# Patient Record
Sex: FEMALE | Race: WHITE
[De-identification: names, ages, dates, MRNs, and addresses within clinical notes are randomized per-mention and may not be internally consistent; named-entity substitution may affect disease eponyms.]

---

## 2017-02-15 PROBLEM — Z00.00 ENCOUNTER FOR PREVENTIVE HEALTH EXAMINATION: Status: ACTIVE | Noted: 2017-02-15

## 2017-02-21 ENCOUNTER — APPOINTMENT (OUTPATIENT)
Dept: CARDIOLOGY | Facility: CLINIC | Age: 57
End: 2017-02-21

## 2017-05-15 ENCOUNTER — OUTPATIENT (OUTPATIENT)
Dept: OUTPATIENT SERVICES | Facility: HOSPITAL | Age: 57
LOS: 1 days | Discharge: HOME | End: 2017-05-15

## 2017-05-15 VITALS
TEMPERATURE: 99 F | SYSTOLIC BLOOD PRESSURE: 117 MMHG | HEIGHT: 64 IN | WEIGHT: 180.34 LBS | OXYGEN SATURATION: 97 % | HEART RATE: 72 BPM | RESPIRATION RATE: 18 BRPM | DIASTOLIC BLOOD PRESSURE: 72 MMHG

## 2017-05-15 DIAGNOSIS — I10 ESSENTIAL (PRIMARY) HYPERTENSION: ICD-10-CM

## 2017-05-15 DIAGNOSIS — Z98.84 BARIATRIC SURGERY STATUS: Chronic | ICD-10-CM

## 2017-05-15 DIAGNOSIS — M25.511 PAIN IN RIGHT SHOULDER: ICD-10-CM

## 2017-05-15 DIAGNOSIS — Z98.890 OTHER SPECIFIED POSTPROCEDURAL STATES: Chronic | ICD-10-CM

## 2017-05-15 DIAGNOSIS — M25.519 PAIN IN UNSPECIFIED SHOULDER: ICD-10-CM

## 2017-05-15 DIAGNOSIS — E11.9 TYPE 2 DIABETES MELLITUS WITHOUT COMPLICATIONS: ICD-10-CM

## 2017-05-15 DIAGNOSIS — Z01.818 ENCOUNTER FOR OTHER PREPROCEDURAL EXAMINATION: ICD-10-CM

## 2017-05-15 LAB
ANION GAP SERPL CALC-SCNC: 6 MMOL/L — SIGNIFICANT CHANGE UP (ref 5–17)
BASOPHILS # BLD AUTO: 0.1 K/UL — SIGNIFICANT CHANGE UP (ref 0–0.2)
BASOPHILS NFR BLD AUTO: 1.7 % — SIGNIFICANT CHANGE UP (ref 0–2)
BUN SERPL-MCNC: 18 MG/DL — SIGNIFICANT CHANGE UP (ref 7–23)
CALCIUM SERPL-MCNC: 8.9 MG/DL — SIGNIFICANT CHANGE UP (ref 8.5–10.1)
CHLORIDE SERPL-SCNC: 108 MMOL/L — SIGNIFICANT CHANGE UP (ref 96–108)
CO2 SERPL-SCNC: 31 MMOL/L — SIGNIFICANT CHANGE UP (ref 22–31)
CREAT SERPL-MCNC: 0.62 MG/DL — SIGNIFICANT CHANGE UP (ref 0.5–1.3)
EOSINOPHIL # BLD AUTO: 0.1 K/UL — SIGNIFICANT CHANGE UP (ref 0–0.5)
EOSINOPHIL NFR BLD AUTO: 1.9 % — SIGNIFICANT CHANGE UP (ref 0–6)
GLUCOSE SERPL-MCNC: 79 MG/DL — SIGNIFICANT CHANGE UP (ref 70–99)
HCT VFR BLD CALC: 41.9 % — SIGNIFICANT CHANGE UP (ref 34.5–45)
HGB BLD-MCNC: 13.7 G/DL — SIGNIFICANT CHANGE UP (ref 11.5–15.5)
HIV 1 & 2 AB SERPL IA.RAPID: SIGNIFICANT CHANGE UP
LYMPHOCYTES # BLD AUTO: 1.6 K/UL — SIGNIFICANT CHANGE UP (ref 1–3.3)
LYMPHOCYTES # BLD AUTO: 32.2 % — SIGNIFICANT CHANGE UP (ref 13–44)
MCHC RBC-ENTMCNC: 27.6 PG — SIGNIFICANT CHANGE UP (ref 27–34)
MCHC RBC-ENTMCNC: 32.8 GM/DL — SIGNIFICANT CHANGE UP (ref 32–36)
MCV RBC AUTO: 84 FL — SIGNIFICANT CHANGE UP (ref 80–100)
MONOCYTES # BLD AUTO: 0.4 K/UL — SIGNIFICANT CHANGE UP (ref 0–0.9)
MONOCYTES NFR BLD AUTO: 7.9 % — SIGNIFICANT CHANGE UP (ref 2–14)
NEUTROPHILS # BLD AUTO: 2.9 K/UL — SIGNIFICANT CHANGE UP (ref 1.8–7.4)
NEUTROPHILS NFR BLD AUTO: 56.2 % — SIGNIFICANT CHANGE UP (ref 43–77)
PLATELET # BLD AUTO: 291 K/UL — SIGNIFICANT CHANGE UP (ref 150–400)
POTASSIUM SERPL-MCNC: 3.7 MMOL/L — SIGNIFICANT CHANGE UP (ref 3.5–5.3)
POTASSIUM SERPL-SCNC: 3.7 MMOL/L — SIGNIFICANT CHANGE UP (ref 3.5–5.3)
RBC # BLD: 4.99 M/UL — SIGNIFICANT CHANGE UP (ref 3.8–5.2)
RBC # FLD: 13 % — SIGNIFICANT CHANGE UP (ref 11–15)
SODIUM SERPL-SCNC: 145 MMOL/L — SIGNIFICANT CHANGE UP (ref 135–145)
WBC # BLD: 5.1 K/UL — SIGNIFICANT CHANGE UP (ref 3.8–10.5)
WBC # FLD AUTO: 5.1 K/UL — SIGNIFICANT CHANGE UP (ref 3.8–10.5)

## 2017-05-15 RX ORDER — SODIUM CHLORIDE 9 MG/ML
3 INJECTION INTRAMUSCULAR; INTRAVENOUS; SUBCUTANEOUS EVERY 8 HOURS
Qty: 0 | Refills: 0 | Status: DISCONTINUED | OUTPATIENT
Start: 2017-05-19 | End: 2017-06-03

## 2017-05-15 NOTE — H&P PST ADULT - HISTORY OF PRESENT ILLNESS
57 yo female c/o right shoulder pain- had evaluation- impingement- scheduled for right shoulder arthroscopy

## 2017-05-18 RX ORDER — CHOLECALCIFEROL (VITAMIN D3) 125 MCG
1 CAPSULE ORAL
Qty: 0 | Refills: 0 | COMMUNITY

## 2017-05-19 ENCOUNTER — RESULT REVIEW (OUTPATIENT)
Age: 57
End: 2017-05-19

## 2017-05-19 ENCOUNTER — TRANSCRIPTION ENCOUNTER (OUTPATIENT)
Age: 57
End: 2017-05-19

## 2017-05-19 ENCOUNTER — OUTPATIENT (OUTPATIENT)
Dept: OUTPATIENT SERVICES | Facility: HOSPITAL | Age: 57
LOS: 1 days | Discharge: HOME | End: 2017-05-19
Payer: COMMERCIAL

## 2017-05-19 VITALS
HEART RATE: 65 BPM | DIASTOLIC BLOOD PRESSURE: 65 MMHG | OXYGEN SATURATION: 98 % | TEMPERATURE: 97 F | RESPIRATION RATE: 16 BRPM | SYSTOLIC BLOOD PRESSURE: 111 MMHG

## 2017-05-19 VITALS
HEIGHT: 64 IN | HEART RATE: 73 BPM | DIASTOLIC BLOOD PRESSURE: 60 MMHG | TEMPERATURE: 97 F | OXYGEN SATURATION: 98 % | RESPIRATION RATE: 18 BRPM | WEIGHT: 169.98 LBS | SYSTOLIC BLOOD PRESSURE: 106 MMHG

## 2017-05-19 DIAGNOSIS — Z98.890 OTHER SPECIFIED POSTPROCEDURAL STATES: Chronic | ICD-10-CM

## 2017-05-19 DIAGNOSIS — Z98.84 BARIATRIC SURGERY STATUS: Chronic | ICD-10-CM

## 2017-05-19 PROCEDURE — 88304 TISSUE EXAM BY PATHOLOGIST: CPT | Mod: 26

## 2017-05-19 RX ORDER — TRIAMTERENE 100 MG/1
1 CAPSULE ORAL
Qty: 0 | Refills: 0 | COMMUNITY

## 2017-05-19 RX ORDER — ONDANSETRON 8 MG/1
4 TABLET, FILM COATED ORAL ONCE
Qty: 0 | Refills: 0 | Status: DISCONTINUED | OUTPATIENT
Start: 2017-05-19 | End: 2017-05-19

## 2017-05-19 RX ORDER — SITAGLIPTIN 50 MG/1
1 TABLET, FILM COATED ORAL
Qty: 0 | Refills: 0 | COMMUNITY

## 2017-05-19 RX ORDER — SODIUM CHLORIDE 9 MG/ML
1000 INJECTION, SOLUTION INTRAVENOUS
Qty: 0 | Refills: 0 | Status: DISCONTINUED | OUTPATIENT
Start: 2017-05-19 | End: 2017-05-19

## 2017-05-19 RX ORDER — LEVOTHYROXINE SODIUM 125 MCG
1 TABLET ORAL
Qty: 0 | Refills: 0 | COMMUNITY

## 2017-05-19 RX ORDER — ERGOCALCIFEROL 1.25 MG/1
1 CAPSULE ORAL
Qty: 0 | Refills: 0 | COMMUNITY

## 2017-05-19 RX ORDER — MORPHINE SULFATE 50 MG/1
4 CAPSULE, EXTENDED RELEASE ORAL
Qty: 0 | Refills: 0 | Status: DISCONTINUED | OUTPATIENT
Start: 2017-05-19 | End: 2017-05-19

## 2017-05-19 RX ORDER — SODIUM CHLORIDE 9 MG/ML
1000 INJECTION, SOLUTION INTRAVENOUS
Qty: 0 | Refills: 0 | Status: DISCONTINUED | OUTPATIENT
Start: 2017-05-19 | End: 2017-06-03

## 2017-05-19 RX ORDER — ATORVASTATIN CALCIUM 80 MG/1
1 TABLET, FILM COATED ORAL
Qty: 0 | Refills: 0 | COMMUNITY

## 2017-05-19 RX ORDER — OXYCODONE HYDROCHLORIDE 5 MG/1
1 TABLET ORAL
Qty: 40 | Refills: 0 | OUTPATIENT
Start: 2017-05-19

## 2017-05-19 RX ORDER — DULOXETINE HYDROCHLORIDE 30 MG/1
1 CAPSULE, DELAYED RELEASE ORAL
Qty: 0 | Refills: 0 | COMMUNITY

## 2017-05-19 RX ADMIN — SODIUM CHLORIDE 75 MILLILITER(S): 9 INJECTION, SOLUTION INTRAVENOUS at 11:33

## 2017-05-19 NOTE — ASU DISCHARGE PLAN (ADULT/PEDIATRIC). - MEDICATION SUMMARY - MEDICATIONS TO TAKE
I will START or STAY ON the medications listed below when I get home from the hospital:    acetaminophen-oxyCODONE 325 mg-5 mg oral tablet  -- 1 tab(s) by mouth every 4 hours PRN pain MDD:6  -- Caution federal law prohibits the transfer of this drug to any person other  than the person for whom it was prescribed.  May cause drowsiness.  Alcohol may intensify this effect.  Use care when operating dangerous machinery.  This prescription cannot be refilled.  This product contains acetaminophen.  Do not use  with any other product containing acetaminophen to prevent possible liver damage.  Using more of this medication than prescribed may cause serious breathing problems.    -- Indication: For pain    enalapril 2.5 mg oral tablet  -- 1 tab(s) by mouth once a day  -- Indication: For per pmd    DULoxetine 30 mg oral delayed release capsule  -- 1 cap(s) by mouth once a day  -- Indication: For per pmd    Januvia 50 mg oral tablet  -- 1 tab(s) by mouth once a day  -- Indication: For per pmd    atorvastatin 40 mg oral tablet  -- 1 tab(s) by mouth once a day  -- Indication: For per pmd    triamterene  -- 1 tab(s) by mouth once a day  -- Indication: For per pmd    levothyroxine 100 mcg (0.1 mg) oral tablet  -- 1 tab(s) by mouth once a day  -- Indication: For per pmd    Vitamin D2 50,000 intl units (1.25 mg) oral capsule  -- 1 cap(s) by mouth once a week  --  On Sundays  -- Indication: For per pmd

## 2017-05-19 NOTE — BRIEF OPERATIVE NOTE - PROCEDURE
Rotator cuff repair  05/19/2017  subacromial decompression, labral debridement, acromioplasty  Active  [facility]INA

## 2017-05-25 DIAGNOSIS — M24.111 OTHER ARTICULAR CARTILAGE DISORDERS, RIGHT SHOULDER: ICD-10-CM

## 2017-05-25 DIAGNOSIS — M75.41 IMPINGEMENT SYNDROME OF RIGHT SHOULDER: ICD-10-CM

## 2017-05-25 DIAGNOSIS — E66.9 OBESITY, UNSPECIFIED: ICD-10-CM

## 2017-05-25 DIAGNOSIS — M65.9 SYNOVITIS AND TENOSYNOVITIS, UNSPECIFIED: ICD-10-CM

## 2017-05-25 DIAGNOSIS — M75.51 BURSITIS OF RIGHT SHOULDER: ICD-10-CM

## 2017-05-25 DIAGNOSIS — Z91.040 LATEX ALLERGY STATUS: ICD-10-CM

## 2019-07-16 PROBLEM — I10 ESSENTIAL (PRIMARY) HYPERTENSION: Chronic | Status: ACTIVE | Noted: 2017-05-15

## 2019-07-16 PROBLEM — E66.9 OBESITY, UNSPECIFIED: Chronic | Status: ACTIVE | Noted: 2017-05-15

## 2019-07-16 PROBLEM — E11.9 TYPE 2 DIABETES MELLITUS WITHOUT COMPLICATIONS: Chronic | Status: ACTIVE | Noted: 2017-05-15

## 2020-02-26 ENCOUNTER — APPOINTMENT (OUTPATIENT)
Dept: ENDOCRINOLOGY | Facility: CLINIC | Age: 60
End: 2020-02-26
Payer: COMMERCIAL

## 2020-02-26 VITALS
WEIGHT: 192 LBS | SYSTOLIC BLOOD PRESSURE: 142 MMHG | HEART RATE: 92 BPM | HEIGHT: 64 IN | BODY MASS INDEX: 32.78 KG/M2 | DIASTOLIC BLOOD PRESSURE: 80 MMHG

## 2020-02-26 DIAGNOSIS — Z86.59 PERSONAL HISTORY OF OTHER MENTAL AND BEHAVIORAL DISORDERS: ICD-10-CM

## 2020-02-26 DIAGNOSIS — Z87.898 PERSONAL HISTORY OF OTHER SPECIFIED CONDITIONS: ICD-10-CM

## 2020-02-26 DIAGNOSIS — Z87.442 PERSONAL HISTORY OF URINARY CALCULI: ICD-10-CM

## 2020-02-26 DIAGNOSIS — Z80.9 FAMILY HISTORY OF MALIGNANT NEOPLASM, UNSPECIFIED: ICD-10-CM

## 2020-02-26 DIAGNOSIS — Z83.3 FAMILY HISTORY OF DIABETES MELLITUS: ICD-10-CM

## 2020-02-26 DIAGNOSIS — Z87.891 PERSONAL HISTORY OF NICOTINE DEPENDENCE: ICD-10-CM

## 2020-02-26 DIAGNOSIS — Z86.39 PERSONAL HISTORY OF OTHER ENDOCRINE, NUTRITIONAL AND METABOLIC DISEASE: ICD-10-CM

## 2020-02-26 LAB — GLUCOSE BLDC GLUCOMTR-MCNC: 107

## 2020-02-26 PROCEDURE — 82962 GLUCOSE BLOOD TEST: CPT

## 2020-02-26 PROCEDURE — 99204 OFFICE O/P NEW MOD 45 MIN: CPT | Mod: 25

## 2020-02-26 RX ORDER — SITAGLIPTIN 50 MG/1
50 TABLET, FILM COATED ORAL DAILY
Refills: 0 | Status: DISCONTINUED | COMMUNITY
End: 2020-02-26

## 2020-02-26 NOTE — REVIEW OF SYSTEMS
[Recent Weight Gain (___ Lbs)] : recent [unfilled] ~Ulb weight gain [Joint Stiffness] : joint stiffness [Insomnia] : insomnia [Fatigue] : no fatigue [Chest Pain] : no chest pain [Shortness Of Breath] : no shortness of breath [Nausea] : no nausea [Polyuria] : no polyuria [Pain/Numbness of Digits] : no pain/numbness of digits [Ulcer] : no ulcer [Polydipsia] : no polydipsia [FreeTextEntry3] : No DR

## 2020-02-26 NOTE — PHYSICAL EXAM
[No Acute Distress] : no acute distress [Normal Sclera/Conjunctiva] : normal sclera/conjunctiva [No Neck Mass] : no neck mass was observed [No Proptosis] : no proptosis [No LAD] : no lymphadenopathy [Thyroid Not Enlarged] : the thyroid was not enlarged [No Thyroid Nodules] : there were no palpable thyroid nodules [Normal Rate] : heart rate was normal  [Clear to Auscultation] : lungs were clear to auscultation bilaterally [Normal Rate and Effort] : normal respiratory rhythm and effort [Normal S1, S2] : normal S1 and S2 [Regular Rhythm] : with a regular rhythm [Murmurs] : no murmurs [Normal Gait] : normal gait [No Edema] : there was no peripheral edema [No Clubbing, Cyanosis] : no clubbing  or cyanosis of the fingernails [Left Foot Was Examined] : left foot ~C was examined [Right Foot Was Examined] : right foot ~C was examined [Normal] : normal [No Tremors] : no tremors [2+] : 2+ in the dorsalis pedis [Normal Sensation on Monofilament Testing] : normal sensation on monofilament testing of lower extremities [Normal Insight/Judgement] : insight and judgment were intact [Normal Affect] : the affect was normal [Normal Mood] : the mood was normal [Foot Ulcers] : no foot ulcers [Acanthosis Nigricans] : no acanthosis nigricans [de-identified] : Obese female [Diminished Throughout Both Feet] : normal tactile sensation with monofilament testing throughout both feet

## 2020-02-26 NOTE — HISTORY OF PRESENT ILLNESS
[FreeTextEntry1] : Was diagnosed with DM based off blood work in 2009.  Was initially treated with metformin, but developed rash (tried metformin 3 separate times with rash occurring after several times).  Then was started on Lantus and Humalog and Januvia.  Went for gastric bypass in 2016 and insulin was stopped thereafter.  Stayed on Januvia, but insulin was stopped after surgery.    Lost about 75 pounds after surgery, but has now gained back 20 pounds and is concerned about this and complains of increased hunger.    Ran out of all of her medications about 9 months ago.  Also has a history of hypothyroidism and was prescribed LT4 100 mcg daily, but ran out of this. \par \par Quality: type 2 DM\par Severity:  moderate\par Duration of diabetes:  since 2009\par Onset: found on routine labs \par Associated Complications/ Symptoms:   No known microvascular complications\par Modifying Factors:  Better with medication and after gastric bypass\par \par SMBG:  no longer testing, but does have some rare symptoms of hypoglycemia.  \par \par Current Diabetic Medication Regimen:\par No medications right now, was on Januvia.  \par

## 2020-02-26 NOTE — ASSESSMENT
[FreeTextEntry1] : 59 year old female with type 2 DM, obesity s/p gastric bypass, hypothyroidism, hyperlipidemia and vitamin D Deficiency.  \par \par 1.  Type 2 DM-  check labs now.  Start SMBG and test once daily alternating between fasting and PP times.  Due to concern of weight gain and increase hunger, will D/C Januvia and start Ozempic.  \par 2.  Hypothyroidism-  likely needs to resume LT4, but will check TFTs to confirm\par 3.  Hyperlipidemia-  check lipids now and if needed, add back atorvastatin\par 4.  Vitamin D deficiency-  check level now.

## 2020-02-28 LAB
25(OH)D3 SERPL-MCNC: 24.7 NG/ML
ALBUMIN SERPL ELPH-MCNC: 4.5 G/DL
ALP BLD-CCNC: 115 U/L
ALT SERPL-CCNC: 34 U/L
ANION GAP SERPL CALC-SCNC: 13 MMOL/L
AST SERPL-CCNC: 26 U/L
BASOPHILS # BLD AUTO: 0.04 K/UL
BASOPHILS NFR BLD AUTO: 0.7 %
BILIRUB SERPL-MCNC: 0.2 MG/DL
BUN SERPL-MCNC: 15 MG/DL
CALCIUM SERPL-MCNC: 9.9 MG/DL
CHLORIDE SERPL-SCNC: 103 MMOL/L
CHOLEST SERPL-MCNC: 230 MG/DL
CHOLEST/HDLC SERPL: 2.9 RATIO
CO2 SERPL-SCNC: 27 MMOL/L
CREAT SERPL-MCNC: 0.66 MG/DL
CREAT SPEC-SCNC: 136 MG/DL
EOSINOPHIL # BLD AUTO: 0.14 K/UL
EOSINOPHIL NFR BLD AUTO: 2.3 %
ESTIMATED AVERAGE GLUCOSE: 157 MG/DL
GLUCOSE SERPL-MCNC: 121 MG/DL
HBA1C MFR BLD HPLC: 7.1 %
HCT VFR BLD CALC: 45.2 %
HDLC SERPL-MCNC: 80 MG/DL
HGB BLD-MCNC: 13.4 G/DL
IMM GRANULOCYTES NFR BLD AUTO: 0.3 %
LDLC SERPL CALC-MCNC: 122 MG/DL
LYMPHOCYTES # BLD AUTO: 2.12 K/UL
LYMPHOCYTES NFR BLD AUTO: 35.1 %
MAN DIFF?: NORMAL
MCHC RBC-ENTMCNC: 26 PG
MCHC RBC-ENTMCNC: 29.6 GM/DL
MCV RBC AUTO: 87.8 FL
MICROALBUMIN 24H UR DL<=1MG/L-MCNC: <1.2 MG/DL
MICROALBUMIN/CREAT 24H UR-RTO: NORMAL MG/G
MONOCYTES # BLD AUTO: 0.44 K/UL
MONOCYTES NFR BLD AUTO: 7.3 %
NEUTROPHILS # BLD AUTO: 3.28 K/UL
NEUTROPHILS NFR BLD AUTO: 54.3 %
PLATELET # BLD AUTO: 390 K/UL
POTASSIUM SERPL-SCNC: 4.5 MMOL/L
PROT SERPL-MCNC: 7 G/DL
RBC # BLD: 5.15 M/UL
RBC # FLD: 15 %
SODIUM SERPL-SCNC: 143 MMOL/L
T4 FREE SERPL-MCNC: 1 NG/DL
THYROGLOB AB SERPL-ACNC: 312 IU/ML
THYROPEROXIDASE AB SERPL IA-ACNC: 595 IU/ML
TRIGL SERPL-MCNC: 137 MG/DL
TSH SERPL-ACNC: 6.54 UIU/ML
WBC # FLD AUTO: 6.04 K/UL

## 2020-03-03 RX ORDER — LEVOTHYROXINE SODIUM 0.1 MG/1
100 TABLET ORAL DAILY
Refills: 0 | Status: DISCONTINUED | COMMUNITY
End: 2020-03-03

## 2020-05-27 ENCOUNTER — APPOINTMENT (OUTPATIENT)
Dept: ENDOCRINOLOGY | Facility: CLINIC | Age: 60
End: 2020-05-27
Payer: COMMERCIAL

## 2020-05-27 PROCEDURE — 99213 OFFICE O/P EST LOW 20 MIN: CPT | Mod: 95

## 2020-05-27 NOTE — HISTORY OF PRESENT ILLNESS
[Medical Office: ([facility])___] : at the medical office located in  [Other Location: e.g. School (Enter Location, City,State)___] : at [unfilled], at the time of the visit. [Verbal consent obtained from patient] : the patient, [unfilled] [FreeTextEntry1] : Was diagnosed with DM based off blood work in .  Was initially treated with metformin, but developed rash (tried metformin 3 separate times with rash occurring after several times).  Then was started on Lantus and Humalog and Januvia.  Went for gastric bypass in  and insulin was stopped thereafter.  Stayed on Januvia, but insulin was stopped after surgery.    Lost about 75 pounds after surgery. \par \par Quality: type 2 DM\par Severity:  moderate\par Duration of diabetes:  since \par Onset: found on routine labs \par Associated Complications/ Symptoms: No known microvascular complications\par Modifying Factors:  Better with medication and after gastric bypass\par \par SMB time a day \par average blood glucose 148 \par \par Current Diabetic Medication Regimen:\par Ozempic 0.5 mg weekly - constipation for 1 week then resolved \par \par -Levothyroxine 50 mcg daily - taking appropriately \par \par Weight: loss 11 pounds \par \par C/o hair loss for 6 months \par

## 2020-05-27 NOTE — REVIEW OF SYSTEMS
[Recent Weight Loss (___ Lbs)] : recent weight loss: [unfilled] lbs [Blurred Vision] : blurred vision [Hair Loss] : hair loss [Dysphagia] : no dysphagia [Visual Field Defect] : no visual field defect [Neck Pain] : no neck pain [Dysphonia] : no dysphonia [Dry Skin] : no dry skin [FreeTextEntry3] : blurry vision at times

## 2020-05-27 NOTE — ASSESSMENT
[FreeTextEntry1] : 59 year old female with type 2 DM, obesity s/p gastric bypass, hypothyroidism, hyperlipidemia and vitamin D Deficiency.  \par \par 1.)  Type 2 DM-  check labs now.  \par - continue Ozempic\par - increase SMBG and test once daily alternating between fasting and PP times.  \par \par 2.)  Hypothyroidism- continue current dose of LT4, but will check TFTs to confirm\par \par 3. ) Hyperlipidemia-  check lipids now and if needed, add back atorvastatin\par \par 4.)   Vitamin D deficiency-  check level now. \par \par Follow up visit 3 months. \par \par Start Time: 2:59\par End TIme:  3:16

## 2020-06-29 ENCOUNTER — RX RENEWAL (OUTPATIENT)
Age: 60
End: 2020-06-29

## 2020-09-08 ENCOUNTER — APPOINTMENT (OUTPATIENT)
Dept: ENDOCRINOLOGY | Facility: CLINIC | Age: 60
End: 2020-09-08
Payer: COMMERCIAL

## 2020-09-08 PROCEDURE — 36415 COLL VENOUS BLD VENIPUNCTURE: CPT

## 2020-09-09 LAB
25(OH)D3 SERPL-MCNC: 26.7 NG/ML
ALBUMIN SERPL ELPH-MCNC: 4.1 G/DL
ALP BLD-CCNC: 89 U/L
ALT SERPL-CCNC: 11 U/L
ANION GAP SERPL CALC-SCNC: 12 MMOL/L
AST SERPL-CCNC: 13 U/L
BASOPHILS # BLD AUTO: 0.05 K/UL
BASOPHILS NFR BLD AUTO: 0.7 %
BILIRUB SERPL-MCNC: 0.3 MG/DL
BUN SERPL-MCNC: 15 MG/DL
CALCIUM SERPL-MCNC: 9.6 MG/DL
CHLORIDE SERPL-SCNC: 107 MMOL/L
CHOLEST SERPL-MCNC: 157 MG/DL
CHOLEST/HDLC SERPL: 2.4 RATIO
CO2 SERPL-SCNC: 26 MMOL/L
CREAT SERPL-MCNC: 0.84 MG/DL
CREAT SPEC-SCNC: 322 MG/DL
EOSINOPHIL # BLD AUTO: 0.2 K/UL
EOSINOPHIL NFR BLD AUTO: 3 %
ESTIMATED AVERAGE GLUCOSE: 126 MG/DL
GLUCOSE SERPL-MCNC: 138 MG/DL
HBA1C MFR BLD HPLC: 6 %
HCT VFR BLD CALC: 45.5 %
HDLC SERPL-MCNC: 66 MG/DL
HGB BLD-MCNC: 13.1 G/DL
IMM GRANULOCYTES NFR BLD AUTO: 0.3 %
LDLC SERPL CALC-MCNC: 67 MG/DL
LYMPHOCYTES # BLD AUTO: 1.87 K/UL
LYMPHOCYTES NFR BLD AUTO: 27.8 %
MAN DIFF?: NORMAL
MCHC RBC-ENTMCNC: 26.3 PG
MCHC RBC-ENTMCNC: 28.8 GM/DL
MCV RBC AUTO: 91.4 FL
MICROALBUMIN 24H UR DL<=1MG/L-MCNC: 3.9 MG/DL
MICROALBUMIN/CREAT 24H UR-RTO: 12 MG/G
MONOCYTES # BLD AUTO: 0.46 K/UL
MONOCYTES NFR BLD AUTO: 6.8 %
NEUTROPHILS # BLD AUTO: 4.12 K/UL
NEUTROPHILS NFR BLD AUTO: 61.4 %
PLATELET # BLD AUTO: 341 K/UL
POTASSIUM SERPL-SCNC: 4 MMOL/L
PROT SERPL-MCNC: 6.2 G/DL
RBC # BLD: 4.98 M/UL
RBC # FLD: 15.7 %
SODIUM SERPL-SCNC: 145 MMOL/L
T4 FREE SERPL-MCNC: 1.2 NG/DL
TRIGL SERPL-MCNC: 119 MG/DL
TSH SERPL-ACNC: 4.82 UIU/ML
VIT B12 SERPL-MCNC: 1259 PG/ML
WBC # FLD AUTO: 6.72 K/UL

## 2020-09-15 ENCOUNTER — APPOINTMENT (OUTPATIENT)
Dept: ENDOCRINOLOGY | Facility: CLINIC | Age: 60
End: 2020-09-15
Payer: COMMERCIAL

## 2020-09-15 ENCOUNTER — TRANSCRIPTION ENCOUNTER (OUTPATIENT)
Age: 60
End: 2020-09-15

## 2020-09-15 PROCEDURE — 99441: CPT

## 2020-09-15 RX ORDER — LEVOTHYROXINE SODIUM 0.05 MG/1
50 TABLET ORAL DAILY
Qty: 90 | Refills: 1 | Status: DISCONTINUED | COMMUNITY
Start: 2020-03-03 | End: 2020-09-15

## 2020-09-15 NOTE — ASSESSMENT
[FreeTextEntry1] : 59 year old female with type 2 DM, obesity s/p gastric bypass, hypothyroidism, hyperlipidemia and vitamin D Deficiency.  Her diabetes control is improving, but she is mildly hypothyroid on current dose of LT4.  \par \par 1. Type 2 DM-  continue current dose of Ozempic.   repeat A1c in 4 months. \par 2. Hypothyroidism-  Increase LT4 to 75 mcg daily.  Repeat TFTs before next appointment.  \par 3. Hyperlipidemia-  Continue atorvastatin\par 4. Vitamin D deficiency- add Vitamin D3 2000 IU daily.

## 2020-09-15 NOTE — HISTORY OF PRESENT ILLNESS
[Home] : at home, [unfilled] , at the time of the visit. [Medical Office: ([facility])___] : at the medical office located in  [Verbal consent obtained from patient] : the patient, [unfilled] [FreeTextEntry1] : Telephonic appointment conducted due to COVID-19 Pandemic. \par Time started:  6:19 PM\par Time Ended:  6:28 PM\par \par Follow up DM and hypothyroidism (Hashimoto's Thyroiditis), hyperlipidemia\par \par Quality: type 2 DM\par Severity:  moderate\par Duration of diabetes:  since 2009\par Onset: found on routine labs \par Associated Complications/ Symptoms:   No known microvascular complications\par Modifying Factors:  Better with medication and after gastric bypass\par \par SMBG: testing once a day with values <140 mg/dl.  \par \par Current Diabetic Medication Regimen:\par Ozempic 0.5 mg qweek\par Intolerant to Metformin due to rash.\par Was on insulin prior to gastric bypass in 2016.  \par \par Has lost some weight since starting Ozempic.  \par

## 2020-09-15 NOTE — REVIEW OF SYSTEMS
[Recent Weight Loss (___ Lbs)] : recent weight loss: [unfilled] lbs [Hair Loss] : hair loss [Fatigue] : no fatigue [Nausea] : no nausea

## 2020-12-14 ENCOUNTER — RX RENEWAL (OUTPATIENT)
Age: 60
End: 2020-12-14

## 2021-01-11 ENCOUNTER — RX RENEWAL (OUTPATIENT)
Age: 61
End: 2021-01-11

## 2021-01-19 ENCOUNTER — APPOINTMENT (OUTPATIENT)
Dept: ENDOCRINOLOGY | Facility: CLINIC | Age: 61
End: 2021-01-19

## 2021-01-26 ENCOUNTER — APPOINTMENT (OUTPATIENT)
Dept: ENDOCRINOLOGY | Facility: CLINIC | Age: 61
End: 2021-01-26
Payer: COMMERCIAL

## 2021-01-26 VITALS
DIASTOLIC BLOOD PRESSURE: 72 MMHG | SYSTOLIC BLOOD PRESSURE: 120 MMHG | HEART RATE: 81 BPM | BODY MASS INDEX: 31.24 KG/M2 | HEIGHT: 64 IN | WEIGHT: 183 LBS | OXYGEN SATURATION: 98 %

## 2021-01-26 LAB — GLUCOSE BLDC GLUCOMTR-MCNC: 93

## 2021-01-26 PROCEDURE — 82962 GLUCOSE BLOOD TEST: CPT

## 2021-01-26 PROCEDURE — 99072 ADDL SUPL MATRL&STAF TM PHE: CPT

## 2021-01-26 PROCEDURE — 99214 OFFICE O/P EST MOD 30 MIN: CPT | Mod: 25

## 2021-01-26 NOTE — ASSESSMENT
[FreeTextEntry1] : 60 year old female with type 2 DM, obesity s/p gastric bypass, hypothyroidism, hyperlipidemia and vitamin D Deficiency.  \par \par 1. Type 2 DM-  continue Ozempic.   Check A1c now.  \par 2. Hypothyroidism-  Continue LT4, check TFTS now (dose increased last visit).\par 3. Hyperlipidemia-  Continue atorvastatin, check lipids now.  \par 4. Vitamin D deficiency-   Continue vitamin D supplement

## 2021-01-26 NOTE — REVIEW OF SYSTEMS
[Recent Weight Loss (___ Lbs)] : recent weight loss: [unfilled] lbs [Hair Loss] : hair loss [Chest Pain] : no chest pain [Shortness Of Breath] : no shortness of breath [Nausea] : no nausea [Pain/Numbness of Digits] : no pain/numbness of digits

## 2021-02-03 ENCOUNTER — TRANSCRIPTION ENCOUNTER (OUTPATIENT)
Age: 61
End: 2021-02-03

## 2021-04-29 ENCOUNTER — RX RENEWAL (OUTPATIENT)
Age: 61
End: 2021-04-29

## 2021-04-30 RX ORDER — LANCETS
EACH MISCELLANEOUS
Qty: 100 | Refills: 1 | Status: ACTIVE | COMMUNITY
Start: 2020-02-26 | End: 1900-01-01

## 2021-04-30 RX ORDER — BLOOD-GLUCOSE METER
W/DEVICE KIT MISCELLANEOUS
Qty: 1 | Refills: 0 | Status: ACTIVE | COMMUNITY
Start: 2021-04-30 | End: 1900-01-01

## 2021-05-12 ENCOUNTER — RX RENEWAL (OUTPATIENT)
Age: 61
End: 2021-05-12

## 2021-05-24 LAB
LDLC SERPL DIRECT ASSAY-MCNC: 70
MICROALBUMIN/CREAT 24H UR-RTO: 3

## 2021-05-25 ENCOUNTER — APPOINTMENT (OUTPATIENT)
Dept: ENDOCRINOLOGY | Facility: CLINIC | Age: 61
End: 2021-05-25

## 2021-12-14 ENCOUNTER — APPOINTMENT (OUTPATIENT)
Dept: ENDOCRINOLOGY | Facility: CLINIC | Age: 61
End: 2021-12-14
Payer: COMMERCIAL

## 2021-12-14 VITALS
SYSTOLIC BLOOD PRESSURE: 124 MMHG | WEIGHT: 160 LBS | HEART RATE: 86 BPM | BODY MASS INDEX: 27.31 KG/M2 | HEIGHT: 64 IN | OXYGEN SATURATION: 98 % | DIASTOLIC BLOOD PRESSURE: 68 MMHG

## 2021-12-14 DIAGNOSIS — Z85.038 PERSONAL HISTORY OF OTHER MALIGNANT NEOPLASM OF LARGE INTESTINE: ICD-10-CM

## 2021-12-14 LAB — GLUCOSE BLDC GLUCOMTR-MCNC: 88

## 2021-12-14 PROCEDURE — 82962 GLUCOSE BLOOD TEST: CPT

## 2021-12-14 PROCEDURE — 99214 OFFICE O/P EST MOD 30 MIN: CPT | Mod: 25

## 2021-12-14 RX ORDER — ERGOCALCIFEROL (VITAMIN D2) 1250 MCG
50000 CAPSULE ORAL
Refills: 0 | Status: DISCONTINUED | COMMUNITY
End: 2021-12-14

## 2021-12-14 NOTE — PHYSICAL EXAM
[Healthy Appearance] : healthy appearance [No Acute Distress] : no acute distress [Normal Sclera/Conjunctiva] : normal sclera/conjunctiva [No Proptosis] : no proptosis [No Neck Mass] : no neck mass was observed [No LAD] : no lymphadenopathy [Supple] : the neck was supple [Thyroid Not Enlarged] : the thyroid was not enlarged [No Thyroid Nodules] : no palpable thyroid nodules [No Respiratory Distress] : no respiratory distress [Clear to Auscultation] : lungs were clear to auscultation bilaterally [Normal S1, S2] : normal S1 and S2 [No Murmurs] : no murmurs [Normal Rate] : heart rate was normal [Regular Rhythm] : with a regular rhythm [Right foot was examined, including] : right foot ~C was examined, including visual inspection with sensory and pulse exams [Left foot was examined, including] : left foot ~C was examined, including visual inspection with sensory and pulse exams [Normal] : normal [2+] : 2+ in the dorsalis pedis [Normal Affect] : the affect was normal [Normal Insight/Judgement] : insight and judgment were intact [Normal Mood] : the mood was normal [Acanthosis Nigricans] : no acanthosis nigricans [Diminished Throughout Both Feet] : normal tactile sensation with monofilament testing throughout both feet [FreeTextEntry1] : Callus [FreeTextEntry5] : Callus

## 2021-12-14 NOTE — ASSESSMENT
[FreeTextEntry1] : 61 year old female with type 2 DM, obesity s/p gastric bypass, hypothyroidism, hyperlipidemia and vitamin D Deficiency.   Her diabetes seems well controlled.   \par \par 1. Type 2 DM-  Check A1c now.  If A1c is < 6%, will start decreasing dose of Ozempic as she reports occasional hypoglycemia and has lost more weight. \par 2. Hypothyroidism-   Resume LT4.  Check TFTs now, but will likely be suboptimal due to missing Rx.   \par 3. Hyperlipidemia-  Continue atorvastatin, check lipids now.  \par 4. Vitamin D deficiency-    Check level now.  May need Rx.   \par \par Follow up in 4 months.

## 2021-12-14 NOTE — REVIEW OF SYSTEMS
[Recent Weight Loss (___ Lbs)] : recent weight loss: [unfilled] lbs [Constipation] : constipation [Chest Pain] : no chest pain [Shortness Of Breath] : no shortness of breath [Nausea] : no nausea [Pain/Numbness of Digits] : no pain/numbness of digits

## 2021-12-14 NOTE — HISTORY OF PRESENT ILLNESS
[FreeTextEntry1] : Follow up DM and hypothyroidism (Hashimoto's Thyroiditis), hyperlipidemia\par In March 2021 had CT done for kidney stone and showed incidental colon mass and was diagnosed with colon cancer and had partial colectomy.  \par \par Quality: type 2 DM\par Severity:  moderate\par Duration of diabetes:  since 2009\par Onset: found on routine labs \par Associated Complications/ Symptoms:   No known microvascular complications\par Modifying Factors:  Better with medication and after gastric bypass\par \par SMBG: testing once a day .  Most BG in the 100- 140 mg/dl range.   Reports a few episodes of mild hypoglycemia.   \par \par Current Diabetic Medication Regimen:\par Ozempic 0.5 mg qweek\par Intolerant to Metformin due to rash.\par Was on insulin prior to gastric bypass in 2016.  \par Ran out of LT4 about a month ago.    \par \par Has lost about 20 pounds since last visit.

## 2022-04-12 ENCOUNTER — RX RENEWAL (OUTPATIENT)
Age: 62
End: 2022-04-12

## 2022-04-27 ENCOUNTER — RX RENEWAL (OUTPATIENT)
Age: 62
End: 2022-04-27

## 2022-05-17 ENCOUNTER — APPOINTMENT (OUTPATIENT)
Dept: ENDOCRINOLOGY | Facility: CLINIC | Age: 62
End: 2022-05-17
Payer: COMMERCIAL

## 2022-05-17 VITALS
WEIGHT: 162 LBS | BODY MASS INDEX: 27.66 KG/M2 | SYSTOLIC BLOOD PRESSURE: 120 MMHG | DIASTOLIC BLOOD PRESSURE: 70 MMHG | HEART RATE: 80 BPM | HEIGHT: 64 IN

## 2022-05-17 DIAGNOSIS — E55.9 VITAMIN D DEFICIENCY, UNSPECIFIED: ICD-10-CM

## 2022-05-17 LAB
GLUCOSE BLDC GLUCOMTR-MCNC: 80
HBA1C MFR BLD HPLC: 6

## 2022-05-17 PROCEDURE — 82962 GLUCOSE BLOOD TEST: CPT

## 2022-05-17 PROCEDURE — 99214 OFFICE O/P EST MOD 30 MIN: CPT | Mod: 25

## 2022-05-17 NOTE — ASSESSMENT
[FreeTextEntry1] : 61 year old female with type 2 DM, obesity s/p gastric bypass, hypothyroidism, hyperlipidemia and vitamin D Deficiency.   Her diabetes seems well controlled.   \par \par 1. Type 2 DM-   Continue Ozempic.  Check labs now.   \par 2. Hypothyroidism-  Continue LT4.  Check TFTs now.    Due to nodularity on exam, check US now.   \par 3. Hyperlipidemia-  Continue atorvastatin, check lipids now.  \par 4. Vitamin D deficiency-    Check level now.  \par \par Follow up in 4 months.

## 2022-05-17 NOTE — REVIEW OF SYSTEMS
[Insomnia] : insomnia [Fatigue] : no fatigue [Recent Weight Gain (___ Lbs)] : no recent weight gain [Recent Weight Loss (___ Lbs)] : no recent weight loss [Chest Pain] : no chest pain [Shortness Of Breath] : no shortness of breath [Nausea] : no nausea [Hair Loss] : no hair loss

## 2022-05-17 NOTE — PHYSICAL EXAM
[Healthy Appearance] : healthy appearance [No Acute Distress] : no acute distress [Normal Sclera/Conjunctiva] : normal sclera/conjunctiva [No Proptosis] : no proptosis [No LAD] : no lymphadenopathy [No Respiratory Distress] : no respiratory distress [Clear to Auscultation] : lungs were clear to auscultation bilaterally [Normal S1, S2] : normal S1 and S2 [No Murmurs] : no murmurs [Normal Rate] : heart rate was normal [Regular Rhythm] : with a regular rhythm [Normal Affect] : the affect was normal [Normal Insight/Judgement] : insight and judgment were intact [Normal Mood] : the mood was normal [Acanthosis Nigricans] : no acanthosis nigricans [de-identified] : palpable nodularity in left thyorid lobe

## 2022-05-17 NOTE — HISTORY OF PRESENT ILLNESS
[FreeTextEntry1] : Follow up DM and hypothyroidism (Hashimoto's Thyroiditis), hyperlipidemia\par PMH of colon cancers/p partial colectomy.    Also had gastric bypass in 2016.\par \par Quality: type 2 DM\par Severity:  moderate\par Duration of diabetes:  since 2009\par Onset: found on routine labs \par Associated Complications/ Symptoms:   No known microvascular complications\par Modifying Factors:  Better with medication and after gastric bypass\par \par SMBG: testing once a day .  Most BG under 130 mg/dl now.    \par \par Current Diabetic Medication Regimen:\par Ozempic 0.5 mg qweek\par Intolerant to Metformin due to rash.\par Was on insulin prior to gastric bypass in 2016.  \par  \par Lost weight after starting Ozempic and now maintaining.

## 2022-09-27 LAB — TSH SERPL-ACNC: 3.05

## 2022-09-28 ENCOUNTER — APPOINTMENT (OUTPATIENT)
Dept: ENDOCRINOLOGY | Facility: CLINIC | Age: 62
End: 2022-09-28

## 2022-10-03 LAB
HBA1C MFR BLD HPLC: 5.3
LDLC SERPL CALC-MCNC: 59
MICROALBUMIN/CREAT 24H UR-RTO: 4

## 2022-10-04 ENCOUNTER — APPOINTMENT (OUTPATIENT)
Dept: ENDOCRINOLOGY | Facility: CLINIC | Age: 62
End: 2022-10-04

## 2022-10-04 VITALS
OXYGEN SATURATION: 97 % | HEART RATE: 89 BPM | SYSTOLIC BLOOD PRESSURE: 122 MMHG | HEIGHT: 64 IN | WEIGHT: 164.5 LBS | RESPIRATION RATE: 16 BRPM | TEMPERATURE: 97.8 F | DIASTOLIC BLOOD PRESSURE: 70 MMHG | BODY MASS INDEX: 28.09 KG/M2

## 2022-10-04 LAB — GLUCOSE BLDC GLUCOMTR-MCNC: 106

## 2022-10-04 PROCEDURE — 82962 GLUCOSE BLOOD TEST: CPT

## 2022-10-04 PROCEDURE — 99214 OFFICE O/P EST MOD 30 MIN: CPT | Mod: 25

## 2022-10-04 NOTE — ASSESSMENT
[FreeTextEntry1] : 62 year old female with type 2 DM, obesity s/p gastric bypass, hypothyroidism, hyperlipidemia and vitamin D Deficiency.   Her diabetes control is improving on GLP-1 agonist therapy.  \par \par 1. Type 2 DM-   Patient prefers to stay on current dose of Ozempic as this has helped maintain current weight, but if A1c remains so low, would recommend decrease in dose in future.  \par 2. Hypothyroidism-  Euthyroid, will continue current dose of LT4.\par 3. Hyperlipidemia-  Continue atorvastatin \par 4.  Vitamin D insufficiency-  add vitamin D3 1000 UT daily.  \par \par Follow up in 6 months.

## 2022-10-04 NOTE — DATA REVIEWED
[FreeTextEntry1] : LABS:\par 10/1/2022:\par TSH 2.34\par Free T4  1.1\par Trig 96\par \par 1/26/2021:\par A1c 6%\par TSH 3.05\par 25(OH)D 18

## 2022-10-04 NOTE — HISTORY OF PRESENT ILLNESS
[FreeTextEntry1] : Follow up DM and hypothyroidism (Hashimoto's Thyroiditis), hyperlipidemia\par PMH of colon cancers/p partial colectomy.    Also had gastric bypass in 2016.\par \par Quality: type 2 DM\par Severity:  moderate\par Duration of diabetes:  since 2009\par Onset: found on routine labs \par Associated Complications/ Symptoms:   No known microvascular complications\par Modifying Factors:  Better with medication and after gastric bypass\par \par SMBG: testing once a day .  Reports most BG under 120 mg/dl.    \par \par Current Diabetic Medication Regimen:\par Ozempic 0.5 mg qweek\par Intolerant to Metformin due to rash.\par Was on insulin prior to gastric bypass in 2016.  \par  \par Weight has remained stable.

## 2022-12-07 ENCOUNTER — RX RENEWAL (OUTPATIENT)
Age: 62
End: 2022-12-07

## 2023-03-30 ENCOUNTER — RX RENEWAL (OUTPATIENT)
Age: 63
End: 2023-03-30

## 2023-04-27 ENCOUNTER — RX RENEWAL (OUTPATIENT)
Age: 63
End: 2023-04-27

## 2023-04-27 RX ORDER — BLOOD SUGAR DIAGNOSTIC
STRIP MISCELLANEOUS
Qty: 100 | Refills: 3 | Status: ACTIVE | COMMUNITY
Start: 2020-02-26 | End: 1900-01-01

## 2023-09-13 ENCOUNTER — RX RENEWAL (OUTPATIENT)
Age: 63
End: 2023-09-13

## 2023-09-22 LAB
HBA1C MFR BLD HPLC: 5.7
LDLC SERPL DIRECT ASSAY-MCNC: 92
MICROALBUMIN/CREAT 24H UR-RTO: 3
TSH SERPL-ACNC: 4.95

## 2023-11-03 ENCOUNTER — RX RENEWAL (OUTPATIENT)
Age: 63
End: 2023-11-03

## 2023-11-06 ENCOUNTER — APPOINTMENT (OUTPATIENT)
Dept: ENDOCRINOLOGY | Facility: CLINIC | Age: 63
End: 2023-11-06
Payer: COMMERCIAL

## 2023-11-06 VITALS
DIASTOLIC BLOOD PRESSURE: 65 MMHG | BODY MASS INDEX: 30.73 KG/M2 | SYSTOLIC BLOOD PRESSURE: 120 MMHG | HEIGHT: 64 IN | WEIGHT: 180 LBS | HEART RATE: 98 BPM | OXYGEN SATURATION: 100 %

## 2023-11-06 DIAGNOSIS — E03.9 HYPOTHYROIDISM, UNSPECIFIED: ICD-10-CM

## 2023-11-06 DIAGNOSIS — E11.9 TYPE 2 DIABETES MELLITUS W/OUT COMPLICATIONS: ICD-10-CM

## 2023-11-06 DIAGNOSIS — E78.5 HYPERLIPIDEMIA, UNSPECIFIED: ICD-10-CM

## 2023-11-06 LAB — GLUCOSE BLDC GLUCOMTR-MCNC: 99

## 2023-11-06 PROCEDURE — 99214 OFFICE O/P EST MOD 30 MIN: CPT | Mod: 25

## 2023-11-06 PROCEDURE — 82962 GLUCOSE BLOOD TEST: CPT

## 2023-12-07 ENCOUNTER — RX RENEWAL (OUTPATIENT)
Age: 63
End: 2023-12-07

## 2023-12-07 RX ORDER — ATORVASTATIN CALCIUM 10 MG/1
10 TABLET, FILM COATED ORAL DAILY
Qty: 90 | Refills: 3 | Status: ACTIVE | COMMUNITY
Start: 2020-06-29 | End: 1900-01-01

## 2023-12-27 NOTE — ASU PATIENT PROFILE, ADULT - FALL HARM RISK TYPE OF ASSESSMENT
Pharmacy on file verified Step-mother states pt c/o rash/irriation to fingers on both hands x2 weeks Admission

## 2024-01-05 ENCOUNTER — APPOINTMENT (OUTPATIENT)
Dept: ORTHOPEDIC SURGERY | Facility: CLINIC | Age: 64
End: 2024-01-05
Payer: COMMERCIAL

## 2024-01-05 VITALS — HEIGHT: 64 IN | BODY MASS INDEX: 30.73 KG/M2 | WEIGHT: 180 LBS

## 2024-01-05 PROCEDURE — 73564 X-RAY EXAM KNEE 4 OR MORE: CPT | Mod: 50

## 2024-01-05 PROCEDURE — 20611 DRAIN/INJ JOINT/BURSA W/US: CPT | Mod: 50

## 2024-01-05 PROCEDURE — 76882 US LMTD JT/FCL EVL NVASC XTR: CPT | Mod: 59

## 2024-01-05 PROCEDURE — 99213 OFFICE O/P EST LOW 20 MIN: CPT | Mod: 25

## 2024-01-05 PROCEDURE — J3490M: CUSTOM

## 2024-01-05 NOTE — PROCEDURE
[Large Joint Injection] : Large joint injection [Bilateral] : bilaterally of the [Knee] : knee [Betadine] : betadine [] : Patient tolerated procedure well [Call if redness, pain or fever occur] : call if redness, pain or fever occur [Apply ice for 15min out of every hour for the next 12-24 hours as tolerated] : apply ice for 15 minutes out of every hour for the next 12-24 hours as tolerated [Patient was advised to rest the joint(s) for ____ days] : patient was advised to rest the joint(s) for [unfilled] days [All ultrasound images have been permanently captured and stored accordingly in our picture archiving and communication system] : All ultrasound images have been permanently captured and stored accordingly in our picture archiving and communication system [Pain] : pain [Inflammation] : inflammation [FreeTextEntry3] : Large Joint Injection / Aspiration: Celestone, Lidocaine, Marcaine and Guidance Ultrasound Large Joint Injection was performed because of pain and inflammation. Anesthesia: ethyl chloride sprayed topically..  Celestone: An injection of Celestone 6 mg , 1 cc. Needle size: 22 gauge ,  Lidocaine: 3 cc.  Marcaine: 3 cc.   Medication was injected in the bilateral knee. Patient has tried OTC's including aspirin, Ibuprofen, Aleve etc or prescription NSAIDS, and/or exercises at home and/ or physical therapy without satisfactory response and Patient has decreased mobility in the joint. After verbal consent using sterile preparation and technique. The risks, benefits, and alternatives to cortisone injection were explained in full to the patient. Risks outlined include but are not limited to infection, sepsis, bleeding, scarring, skin discoloration, temporary increase in pain, syncopal episode, failure to resolve symptoms, allergic reaction, symptom recurrence, and elevation of blood sugar in diabetics. Patient understood the risks. All questions were answered. After discussion of options, patient requested an injection. Oral informed consent was obtained and sterile prep was done of the injection site. Sterile technique was utilized for the procedure including the preparation of the solutions used for the injection. Patient tolerated the procedure well. Advised to ice the injection site this evening. Prep with betadine locally to site. Sterile technique used. Patient tolerated procedure well. Post Procedure Instructions: Patient was advised to call if redness, pain, or fever occur and apply ice for 15 min. out of every hour for the next 12-24 hours as tolerated. patient was advised to rest the joint(s) for 2 days.   Ultrasound Guidance was used for the following reasons: altered anatomic landmarks because of erosive arthritis.   Ultrasound guided injection was performed of the knee, visualization of the needle and placement of injection was performed without complication.

## 2024-01-05 NOTE — DISCUSSION/SUMMARY
[de-identified] : Patient allowed to gently start resuming activities. Discussed change to medication prescription and usage. Offered cortisone steroid injection. Bracing options discussed with patient. Hyaluronic Acid inj pamphlet given to pt. try topical lidocaine reviewed current medications being used by this patient  01/05/2024  RE:  DENIZ DURON   Acct #- [id number]  Attention:  Nurse Reviewer /Medical Director  I am writing this letter as a medical necessity for HA euflexxa both knees Patient has tried analgesics, non-steroid anti-inflammatory agents,  physical therapy, hot or cold compresses,injections of corticosteroids, etc)  which in combination or by themselves has not worked. Based on my patient's condition, I strongly believe that the Hyaluronic aid injections is medically needed.   Thank you for your time and consideration.

## 2024-01-05 NOTE — HISTORY OF PRESENT ILLNESS
[Gradual] : gradual [5] : 5 [Localized] : localized [Injection therapy] : injection therapy [Full time] : Work status: full time [de-identified] : 63 year old female with pain in bilateral knees, symptoms started to return about a month ago, no specific injury. Pain with walking, stairs, getting up from a seated position. No recent injury or mechanical symptoms. She uses biofreeze when more severe. Pain occasionally wakes her at night. She had CSI in 2021 with help in the left knee until now.  [] : no [FreeTextEntry1] : knees [FreeTextEntry5] : has had chronic knee pain , no recent injuries, and has had injections  [FreeTextEntry6] : cramping at night  [de-identified] : 2021 [de-identified] : Dr. Gallito vidal

## 2024-01-05 NOTE — PHYSICAL EXAM
[Bilateral] : knee bilaterally [5___] : hamstring 5[unfilled]/5 [All Views] : anteroposterior, lateral, skyline, and anteroposterior standing [Degenerative change] : Degenerative change [] : no calf tenderness [Left] : left knee [advanced tricompartmental OA with medial compartment narrowing and varus alignment] : advanced tricompartmental OA with medial compartment narrowing and varus alignment [FreeTextEntry9] : R adv lat [TWNoteComboBox7] : flexion 120 degrees [de-identified] : extension 0 degrees

## 2024-01-09 ENCOUNTER — RX RENEWAL (OUTPATIENT)
Age: 64
End: 2024-01-09

## 2024-01-09 RX ORDER — LEVOTHYROXINE SODIUM 0.07 MG/1
75 TABLET ORAL DAILY
Qty: 96 | Refills: 3 | Status: ACTIVE | COMMUNITY
Start: 2020-09-15 | End: 1900-01-01

## 2024-03-01 ENCOUNTER — APPOINTMENT (OUTPATIENT)
Dept: ORTHOPEDIC SURGERY | Facility: CLINIC | Age: 64
End: 2024-03-01
Payer: COMMERCIAL

## 2024-03-01 VITALS — WEIGHT: 180 LBS | BODY MASS INDEX: 30.73 KG/M2 | HEIGHT: 64 IN

## 2024-03-01 PROCEDURE — 99214 OFFICE O/P EST MOD 30 MIN: CPT

## 2024-03-01 RX ORDER — MELOXICAM 15 MG/1
15 TABLET ORAL
Qty: 30 | Refills: 0 | Status: ACTIVE | COMMUNITY
Start: 2024-03-01 | End: 1900-01-01

## 2024-03-01 NOTE — DISCUSSION/SUMMARY
[de-identified] : Patient allowed to gently start resuming activities. Discussed change to medication prescription and usage. Offered cortisone steroid injection. Bracing options discussed with patient. Hyaluronic Acid inj pamphlet given to pt. try topical lidocaine reviewed current medications being used by this patient

## 2024-03-01 NOTE — HISTORY OF PRESENT ILLNESS
[5] : 5 [Dull/Aching] : dull/aching [Localized] : localized [Sitting] : sitting [Stairs] : stairs [Full time] : Work status: full time [de-identified] : pain in bilateral knees, symptoms persists, no specific injury. Pain with walking, stairs, getting up from a seated position. No recent injury or mechanical symptoms. CSI did not help, no swelling [Gradual] : gradual [Injection therapy] : injection therapy [] : no [FreeTextEntry1] : knees [FreeTextEntry5] : has had chronic knee pain , no recent injuries, and has had injections  [FreeTextEntry9] : little relief from cortiosne  [FreeTextEntry6] : cramping at night  [de-identified] : getting up [de-identified] : 2021 [de-identified] : Dr. Gallito vidal

## 2024-03-01 NOTE — PHYSICAL EXAM
[Bilateral] : knee bilaterally [5___] : hamstring 5[unfilled]/5 [] : patient ambulates without assistive device [TWNoteComboBox7] : flexion 120 degrees [de-identified] : extension 0 degrees

## 2024-03-08 ENCOUNTER — APPOINTMENT (OUTPATIENT)
Dept: ORTHOPEDIC SURGERY | Facility: CLINIC | Age: 64
End: 2024-03-08
Payer: COMMERCIAL

## 2024-03-08 VITALS — HEIGHT: 64 IN | WEIGHT: 180 LBS | BODY MASS INDEX: 30.73 KG/M2

## 2024-03-08 PROCEDURE — 99213 OFFICE O/P EST LOW 20 MIN: CPT | Mod: 25

## 2024-03-08 PROCEDURE — 20611 DRAIN/INJ JOINT/BURSA W/US: CPT | Mod: 50

## 2024-03-08 RX ORDER — CELECOXIB 200 MG/1
200 CAPSULE ORAL
Qty: 30 | Refills: 0 | Status: ACTIVE | COMMUNITY
Start: 2024-03-08 | End: 1900-01-01

## 2024-03-08 NOTE — PHYSICAL EXAM
[Bilateral] : knee bilaterally [5___] : hamstring 5[unfilled]/5 [] : no effusion [TWNoteComboBox7] : flexion 120 degrees [de-identified] : extension 0 degrees

## 2024-03-08 NOTE — HISTORY OF PRESENT ILLNESS
[5] : 5 [Radiating] : radiating [Injection therapy] : injection therapy [Ice] : ice [Walking] : walking [de-identified] : Patient returns to start Visco-3 series for both knees.  [] : no [FreeTextEntry1] : knees [FreeTextEntry7] : both ankles [de-identified] : getting mup

## 2024-03-08 NOTE — PROCEDURE
[FreeTextEntry3] : Visco-3  (Large Joint) with Ultrasound Guidance Viscosupplementation Injection: X-ray evidence of Osteoarthritis on this or prior visit and Patient has tried OTC's including aspirin, Ibuprofen, Aleve etc or prescription NSAIDS, and/or exercises at home and/ or physical therapy without satisfactory response.  An injection of Visco-3 2ml #1 was injected into the bilat knee(s). after verbal consent using sterile technique. The risks, benefits, and alternatives to Viscosupplementation injection were explained in full to the patient. Risks outlined include but are not limited to infection, sepsis, bleeding, scarring, skin discoloration, temporary increase in pain, syncopal episode, failure to resolve symptoms, allergic reaction, and symptom recurrence. Signs and symptoms of infection reviewed and patient advised to call immediately for redness, fevers, and/or chills. Patient understood the risks. All questions were answered. After discussion of options, patient requested Viscosupplementation. Oral informed consent was obtained and sterile prep was done of the injection site. Sterile technique was used without complications. The patient tolerated the procedure well. Ice tonight to the injection site.   Ultrasound Guidance was used for the following reasons: altered anatomic landmarks because of erosive arthritis.   Ultrasound guided injection was performed of the knee, visualization of the needle and placement of injection was performed without complication

## 2024-03-15 ENCOUNTER — APPOINTMENT (OUTPATIENT)
Dept: ORTHOPEDIC SURGERY | Facility: CLINIC | Age: 64
End: 2024-03-15
Payer: COMMERCIAL

## 2024-03-15 VITALS — WEIGHT: 180 LBS | HEIGHT: 64 IN | BODY MASS INDEX: 30.73 KG/M2

## 2024-03-15 PROCEDURE — 99212 OFFICE O/P EST SF 10 MIN: CPT | Mod: 25

## 2024-03-15 PROCEDURE — 20611 DRAIN/INJ JOINT/BURSA W/US: CPT | Mod: 50

## 2024-03-15 NOTE — PHYSICAL EXAM
[Bilateral] : knee bilaterally [5___] : hamstring 5[unfilled]/5 [] : no calf tenderness [TWNoteComboBox7] : flexion 120 degrees [de-identified] : extension 0 degrees

## 2024-03-15 NOTE — PROCEDURE
[FreeTextEntry3] : Visco-3  (Large Joint) with Ultrasound Guidance Viscosupplementation Injection: X-ray evidence of Osteoarthritis on this or prior visit and Patient has tried OTC's including aspirin, Ibuprofen, Aleve etc or prescription NSAIDS, and/or exercises at home and/ or physical therapy without satisfactory response.  An injection of Visco-3 2ml #2 was injected into the bilat knee(s). after verbal consent using sterile technique. The risks, benefits, and alternatives to Viscosupplementation injection were explained in full to the patient. Risks outlined include but are not limited to infection, sepsis, bleeding, scarring, skin discoloration, temporary increase in pain, syncopal episode, failure to resolve symptoms, allergic reaction, and symptom recurrence. Signs and symptoms of infection reviewed and patient advised to call immediately for redness, fevers, and/or chills. Patient understood the risks. All questions were answered. After discussion of options, patient requested Viscosupplementation. Oral informed consent was obtained and sterile prep was done of the injection site. Sterile technique was used without complications. The patient tolerated the procedure well. Ice tonight to the injection site.   Ultrasound Guidance was used for the following reasons: altered anatomic landmarks because of erosive arthritis.   Ultrasound guided injection was performed of the knee, visualization of the needle and placement of injection was performed without complication

## 2024-03-15 NOTE — REVIEW OF SYSTEMS
[Joint Pain] : joint pain [Joint Swelling] : joint swelling [Negative] : Heme/Lymph [FreeTextEntry9] : ankles

## 2024-03-15 NOTE — HISTORY OF PRESENT ILLNESS
[4] : 4 [Nothing helps with pain getting better] : Nothing helps with pain getting better [Other:____] : [unfilled] [2] : 2 [de-identified] : Patient returns for Visco-3 series #2 both knees.  [] : no [de-identified] : 3-8-24 [FreeTextEntry1] : knees, left knee worse than the right [de-identified] : knees

## 2024-03-21 PROBLEM — M17.11 ARTHRITIS OF KNEE, RIGHT: Status: ACTIVE | Noted: 2024-01-05

## 2024-03-21 PROBLEM — M17.12 ARTHRITIS OF KNEE, LEFT: Status: ACTIVE | Noted: 2024-01-05

## 2024-03-22 ENCOUNTER — APPOINTMENT (OUTPATIENT)
Dept: ORTHOPEDIC SURGERY | Facility: CLINIC | Age: 64
End: 2024-03-22
Payer: COMMERCIAL

## 2024-03-22 VITALS — BODY MASS INDEX: 30.73 KG/M2 | WEIGHT: 180 LBS | HEIGHT: 64 IN

## 2024-03-22 DIAGNOSIS — M17.12 UNILATERAL PRIMARY OSTEOARTHRITIS, LEFT KNEE: ICD-10-CM

## 2024-03-22 DIAGNOSIS — M17.11 UNILATERAL PRIMARY OSTEOARTHRITIS, RIGHT KNEE: ICD-10-CM

## 2024-03-22 PROCEDURE — 20611 DRAIN/INJ JOINT/BURSA W/US: CPT | Mod: 50

## 2024-03-22 NOTE — PROCEDURE
[FreeTextEntry3] : Visco-3  (Large Joint) with Ultrasound Guidance Viscosupplementation Injection: X-ray evidence of Osteoarthritis on this or prior visit and Patient has tried OTC's including aspirin, Ibuprofen, Aleve etc or prescription NSAIDS, and/or exercises at home and/ or physical therapy without satisfactory response.  An injection of Visco-3 2ml #3 was injected into the bilat knee(s). after verbal consent using sterile technique. The risks, benefits, and alternatives to Viscosupplementation injection were explained in full to the patient. Risks outlined include but are not limited to infection, sepsis, bleeding, scarring, skin discoloration, temporary increase in pain, syncopal episode, failure to resolve symptoms, allergic reaction, and symptom recurrence. Signs and symptoms of infection reviewed and patient advised to call immediately for redness, fevers, and/or chills. Patient understood the risks. All questions were answered. After discussion of options, patient requested Viscosupplementation. Oral informed consent was obtained and sterile prep was done of the injection site. Sterile technique was used without complications. The patient tolerated the procedure well. Ice tonight to the injection site.   Ultrasound Guidance was used for the following reasons: altered anatomic landmarks because of erosive arthritis.   Ultrasound guided injection was performed of the knee, visualization of the needle and placement of injection was performed without complication

## 2024-03-22 NOTE — HISTORY OF PRESENT ILLNESS
[3] : 3 [Localized] : localized [Injection therapy] : injection therapy [Other:____] : [unfilled] [de-identified] : Patient returns for Visco-3 #3 for both knees. Tolerated the first injection well, no adverse reactions.  [] : no [de-identified] : weather [de-identified] : 3-15-24 [de-identified] : knees

## 2024-03-22 NOTE — PHYSICAL EXAM
[Bilateral] : knee bilaterally [5___] : hamstring 5[unfilled]/5 [] : no calf tenderness [TWNoteComboBox7] : flexion 120 degrees [de-identified] : extension 0 degrees

## 2024-03-22 NOTE — DISCUSSION/SUMMARY
[de-identified] : rest, ice, activity modification questions addressed.   Frequency of visco and csi reviewed. Return in 6 weeks as needed.

## 2024-04-22 ENCOUNTER — RX RENEWAL (OUTPATIENT)
Age: 64
End: 2024-04-22

## 2024-04-22 RX ORDER — SEMAGLUTIDE 0.68 MG/ML
2 INJECTION, SOLUTION SUBCUTANEOUS
Qty: 9 | Refills: 3 | Status: ACTIVE | COMMUNITY
Start: 2020-02-26 | End: 1900-01-01

## 2024-07-19 ENCOUNTER — APPOINTMENT (OUTPATIENT)
Dept: ORTHOPEDIC SURGERY | Facility: CLINIC | Age: 64
End: 2024-07-19

## 2024-07-19 VITALS — WEIGHT: 180 LBS | HEIGHT: 64 IN | BODY MASS INDEX: 30.73 KG/M2

## 2024-07-19 DIAGNOSIS — M17.12 UNILATERAL PRIMARY OSTEOARTHRITIS, LEFT KNEE: ICD-10-CM

## 2024-07-19 PROCEDURE — 20611 DRAIN/INJ JOINT/BURSA W/US: CPT | Mod: LT

## 2024-07-19 PROCEDURE — J3490M: CUSTOM

## 2024-07-19 PROCEDURE — 99213 OFFICE O/P EST LOW 20 MIN: CPT | Mod: 25

## 2024-07-19 RX ORDER — MELOXICAM 15 MG/1
15 TABLET ORAL
Qty: 30 | Refills: 1 | Status: ACTIVE | COMMUNITY
Start: 2024-07-19 | End: 2024-09-17

## 2024-07-25 ENCOUNTER — NON-APPOINTMENT (OUTPATIENT)
Age: 64
End: 2024-07-25

## 2024-07-25 LAB
HBA1C MFR BLD HPLC: 6.2
LDLC SERPL DIRECT ASSAY-MCNC: 71
MICROALBUMIN/CREAT 24H UR-RTO: 6
TSH SERPL-ACNC: 0.16

## 2024-07-26 ENCOUNTER — APPOINTMENT (OUTPATIENT)
Dept: ENDOCRINOLOGY | Facility: CLINIC | Age: 64
End: 2024-07-26
Payer: COMMERCIAL

## 2024-07-26 ENCOUNTER — APPOINTMENT (OUTPATIENT)
Dept: ORTHOPEDIC SURGERY | Facility: CLINIC | Age: 64
End: 2024-07-26
Payer: COMMERCIAL

## 2024-07-26 VITALS
DIASTOLIC BLOOD PRESSURE: 80 MMHG | HEIGHT: 64 IN | TEMPERATURE: 98 F | BODY MASS INDEX: 33.8 KG/M2 | WEIGHT: 198 LBS | SYSTOLIC BLOOD PRESSURE: 140 MMHG | OXYGEN SATURATION: 99 % | HEART RATE: 90 BPM | RESPIRATION RATE: 16 BRPM

## 2024-07-26 VITALS — WEIGHT: 198 LBS | HEIGHT: 64 IN | BODY MASS INDEX: 33.8 KG/M2

## 2024-07-26 DIAGNOSIS — E11.9 TYPE 2 DIABETES MELLITUS W/OUT COMPLICATIONS: ICD-10-CM

## 2024-07-26 DIAGNOSIS — E78.5 HYPERLIPIDEMIA, UNSPECIFIED: ICD-10-CM

## 2024-07-26 DIAGNOSIS — M17.11 UNILATERAL PRIMARY OSTEOARTHRITIS, RIGHT KNEE: ICD-10-CM

## 2024-07-26 DIAGNOSIS — E03.9 HYPOTHYROIDISM, UNSPECIFIED: ICD-10-CM

## 2024-07-26 LAB — GLUCOSE BLDC GLUCOMTR-MCNC: 132

## 2024-07-26 PROCEDURE — 82962 GLUCOSE BLOOD TEST: CPT

## 2024-07-26 PROCEDURE — J3490M: CUSTOM

## 2024-07-26 PROCEDURE — 20611 DRAIN/INJ JOINT/BURSA W/US: CPT | Mod: RT

## 2024-07-26 PROCEDURE — 99214 OFFICE O/P EST MOD 30 MIN: CPT

## 2024-07-26 PROCEDURE — 99213 OFFICE O/P EST LOW 20 MIN: CPT | Mod: 25

## 2024-07-26 RX ORDER — TIRZEPATIDE 5 MG/.5ML
5 INJECTION, SOLUTION SUBCUTANEOUS
Qty: 3 | Refills: 1 | Status: ACTIVE | COMMUNITY
Start: 2024-07-26 | End: 1900-01-01

## 2024-07-26 NOTE — REVIEW OF SYSTEMS
[Recent Weight Gain (___ Lbs)] : recent weight gain: [unfilled] lbs [Constipation] : constipation [Nausea] : no nausea

## 2024-07-26 NOTE — PHYSICAL EXAM
[Bilateral] : knee bilaterally [5___] : hamstring 5[unfilled]/5 [Negative] : negative Al's [] : patient ambulates without assistive device [TWNoteComboBox7] : flexion 120 degrees [de-identified] : extension 0 degrees

## 2024-07-26 NOTE — PROCEDURE
[FreeTextEntry3] : Large Joint Injection / Aspiration: Celestone, Lidocaine, Marcaine and Guidance Ultrasound Large Joint Injection was performed because of pain and inflammation. Anesthesia: ethyl chloride sprayed topically..  Celestone: An injection of Celestone 6 mg , 1 cc. Needle size: 22 gauge ,  Lidocaine: 3 cc.  Marcaine: 3 cc.   Medication was injected in the right knee. Patient has tried OTC's including aspirin, Ibuprofen, Aleve etc or prescription NSAIDS, and/or exercises at home and/ or physical therapy without satisfactory response and Patient has decreased mobility in the joint. After verbal consent using sterile preparation and technique. The risks, benefits, and alternatives to cortisone injection were explained in full to the patient. Risks outlined include but are not limited to infection, sepsis, bleeding, scarring, skin discoloration, temporary increase in pain, syncopal episode, failure to resolve symptoms, allergic reaction, symptom recurrence, and elevation of blood sugar in diabetics. Patient understood the risks. All questions were answered. After discussion of options, patient requested an injection. Oral informed consent was obtained and sterile prep was done of the injection site. Sterile technique was utilized for the procedure including the preparation of the solutions used for the injection. Patient tolerated the procedure well. Advised to ice the injection site this evening. Prep with betadine locally to site. Sterile technique used. Patient tolerated procedure well. Post Procedure Instructions: Patient was advised to call if redness, pain, or fever occur and apply ice for 15 min. out of every hour for the next 12-24 hours as tolerated. patient was advised to rest the joint(s) for 2 days.   Ultrasound Guidance was used for the following reasons: altered anatomic landmarks because of erosive arthritis.   Ultrasound guided injection was performed of the knee, visualization of the needle and placement of injection was performed without complication.

## 2024-07-26 NOTE — DISCUSSION/SUMMARY
[de-identified] : Patient allowed to gently start resuming activities. Discussed change to medication prescription and usage. Offered cortisone steroid injection.Low dose L Bracing options discussed with patient. Hyaluronic Acid inj pamphlet given to pt. try topical lidocaine reviewed current medications being used by this patient

## 2024-07-26 NOTE — HISTORY OF PRESENT ILLNESS
[4] : 4 [Burning] : burning [Localized] : localized [Shooting] : shooting [Stabbing] : stabbing [Full time] : Work status: full time [de-identified] : pain in bilateral knees, shas oA and some help wtih HA completed 3/22, csi in Jan with some help, has DM, uses mobic on occasion CSI on left helped right knee still uncomfortasble. [] : no [FreeTextEntry1] : rt knee [de-identified] : getting up  [FreeTextEntry9] : voltaren gel and injections

## 2024-07-26 NOTE — HISTORY OF PRESENT ILLNESS
[FreeTextEntry1] : Follow up DM and hypothyroidism (Hashimoto's Thyroiditis), hyperlipidemia PMH of colon cancer s/p partial colectomy. and obesity s/p gastric bypass in 2016.  Quality: type 2 DM Severity:  moderate Duration of diabetes:  since 2009 Onset: found on routine labs  Associated Complications/ Symptoms:   No known microvascular complications Modifying Factors:  Better with medication and after gastric bypass  SMBG: testing once a day.    Current Diabetic Medication Regimen: Ozempic 0.5 mg qweek   Intolerant to Metformin due to rash. Was on insulin prior to gastric bypass in 2016.     LT4 75 mcg was increased to 8 tabs per week last visit.    Complains of chronic constipation, no nausea. Going for colonoscopy tomorrow.    Complains of weight gain.

## 2024-07-26 NOTE — ASSESSMENT
[FreeTextEntry1] : 63 year old female with type 2 DM, obesity s/p gastric bypass, hypothyroidism, hyperlipidemia and vitamin D Deficiency here for follow up.   She is slightly thyrotoxic on current dose of LT4.     1. Type 2 DM-    He DM control is worsening and she has developed constipation while taking Ozempic.  Will cahnge to Mounjaro 5 mg qweek to see if better tolerated.  Will titrate as tolerated.   2. Hypothyroidism-  Reduce LT4 back to 75 mcg one tab daily. 3. Hyperlipidemia- Continue atorvastatin   Follow up in 4 months.

## 2024-11-22 ENCOUNTER — APPOINTMENT (OUTPATIENT)
Dept: ORTHOPEDIC SURGERY | Facility: CLINIC | Age: 64
End: 2024-11-22
Payer: COMMERCIAL

## 2024-11-22 VITALS — BODY MASS INDEX: 33.8 KG/M2 | WEIGHT: 198 LBS | HEIGHT: 64 IN

## 2024-11-22 DIAGNOSIS — M17.12 UNILATERAL PRIMARY OSTEOARTHRITIS, LEFT KNEE: ICD-10-CM

## 2024-11-22 DIAGNOSIS — M17.11 UNILATERAL PRIMARY OSTEOARTHRITIS, RIGHT KNEE: ICD-10-CM

## 2024-11-22 PROCEDURE — 99213 OFFICE O/P EST LOW 20 MIN: CPT | Mod: 25

## 2024-11-22 PROCEDURE — J3490M: CUSTOM

## 2024-11-22 PROCEDURE — 20611 DRAIN/INJ JOINT/BURSA W/US: CPT | Mod: 50

## 2025-01-03 ENCOUNTER — RX RENEWAL (OUTPATIENT)
Age: 65
End: 2025-01-03

## 2025-01-03 RX ORDER — TIRZEPATIDE 5 MG/.5ML
5 INJECTION, SOLUTION SUBCUTANEOUS
Qty: 3 | Refills: 1 | Status: ACTIVE | COMMUNITY
Start: 2025-01-03 | End: 1900-01-01

## 2025-01-09 RX ORDER — MELOXICAM 15 MG/1
15 TABLET ORAL DAILY
Qty: 30 | Refills: 0 | Status: ACTIVE | COMMUNITY
Start: 2025-01-09 | End: 2025-02-08

## 2025-01-10 ENCOUNTER — RX RENEWAL (OUTPATIENT)
Age: 65
End: 2025-01-10

## 2025-04-14 ENCOUNTER — APPOINTMENT (OUTPATIENT)
Dept: ENDOCRINOLOGY | Facility: CLINIC | Age: 65
End: 2025-04-14

## 2025-05-16 ENCOUNTER — RX RENEWAL (OUTPATIENT)
Age: 65
End: 2025-05-16

## 2025-05-30 ENCOUNTER — APPOINTMENT (OUTPATIENT)
Dept: ORTHOPEDIC SURGERY | Facility: CLINIC | Age: 65
End: 2025-05-30
Payer: COMMERCIAL

## 2025-05-30 DIAGNOSIS — M17.12 UNILATERAL PRIMARY OSTEOARTHRITIS, LEFT KNEE: ICD-10-CM

## 2025-05-30 PROCEDURE — 99204 OFFICE O/P NEW MOD 45 MIN: CPT | Mod: 25

## 2025-05-30 PROCEDURE — 20611 DRAIN/INJ JOINT/BURSA W/US: CPT | Mod: LT

## 2025-05-30 PROCEDURE — J3490M: CUSTOM

## 2025-05-30 RX ORDER — DICLOFENAC SODIUM 75 MG/1
75 TABLET, DELAYED RELEASE ORAL
Qty: 60 | Refills: 0 | Status: ACTIVE | COMMUNITY
Start: 2025-05-30 | End: 1900-01-01

## 2025-08-29 ENCOUNTER — APPOINTMENT (OUTPATIENT)
Dept: ORTHOPEDIC SURGERY | Facility: CLINIC | Age: 65
End: 2025-08-29
Payer: COMMERCIAL

## 2025-08-29 VITALS — HEIGHT: 64 IN | BODY MASS INDEX: 33.8 KG/M2 | WEIGHT: 198 LBS

## 2025-08-29 DIAGNOSIS — M17.12 UNILATERAL PRIMARY OSTEOARTHRITIS, LEFT KNEE: ICD-10-CM

## 2025-08-29 DIAGNOSIS — M17.11 UNILATERAL PRIMARY OSTEOARTHRITIS, RIGHT KNEE: ICD-10-CM

## 2025-08-29 PROCEDURE — 99214 OFFICE O/P EST MOD 30 MIN: CPT

## 2025-08-29 PROCEDURE — 73564 X-RAY EXAM KNEE 4 OR MORE: CPT | Mod: 50

## 2025-08-29 RX ORDER — MELOXICAM 15 MG/1
15 TABLET ORAL
Qty: 30 | Refills: 1 | Status: ACTIVE | COMMUNITY
Start: 2025-08-29 | End: 1900-01-01

## 2025-09-17 ENCOUNTER — NON-APPOINTMENT (OUTPATIENT)
Age: 65
End: 2025-09-17

## 2025-09-18 ENCOUNTER — APPOINTMENT (OUTPATIENT)
Dept: ENDOCRINOLOGY | Facility: CLINIC | Age: 65
End: 2025-09-18
Payer: COMMERCIAL

## 2025-09-18 VITALS
HEART RATE: 92 BPM | TEMPERATURE: 98 F | WEIGHT: 207 LBS | HEIGHT: 64 IN | SYSTOLIC BLOOD PRESSURE: 110 MMHG | BODY MASS INDEX: 35.34 KG/M2 | DIASTOLIC BLOOD PRESSURE: 66 MMHG | RESPIRATION RATE: 16 BRPM | OXYGEN SATURATION: 95 %

## 2025-09-18 DIAGNOSIS — E03.9 HYPOTHYROIDISM, UNSPECIFIED: ICD-10-CM

## 2025-09-18 DIAGNOSIS — E78.5 HYPERLIPIDEMIA, UNSPECIFIED: ICD-10-CM

## 2025-09-18 DIAGNOSIS — E11.9 TYPE 2 DIABETES MELLITUS W/OUT COMPLICATIONS: ICD-10-CM

## 2025-09-18 LAB
HBA1C MFR BLD HPLC: 6.2
LDLC SERPL DIRECT ASSAY-MCNC: 70
MICROALBUMIN/CREAT 24H UR-RTO: 2
TSH SERPL-ACNC: 5.21

## 2025-09-18 PROCEDURE — G2211 COMPLEX E/M VISIT ADD ON: CPT

## 2025-09-18 PROCEDURE — 99214 OFFICE O/P EST MOD 30 MIN: CPT

## 2025-09-18 RX ORDER — TIRZEPATIDE 7.5 MG/.5ML
7.5 INJECTION, SOLUTION SUBCUTANEOUS
Refills: 0 | Status: DISCONTINUED | COMMUNITY
End: 2025-09-18

## 2025-09-18 RX ORDER — TIRZEPATIDE 10 MG/.5ML
10 INJECTION, SOLUTION SUBCUTANEOUS
Qty: 1 | Refills: 5 | Status: ACTIVE | COMMUNITY
Start: 2025-09-18 | End: 1900-01-01

## 2025-09-18 RX ORDER — BLOOD-GLUCOSE SENSOR
EACH MISCELLANEOUS
Qty: 2 | Refills: 5 | Status: ACTIVE | COMMUNITY
Start: 2025-09-18 | End: 1900-01-01

## 2025-09-18 RX ORDER — LEVOTHYROXINE SODIUM 0.09 MG/1
88 TABLET ORAL
Qty: 90 | Refills: 1 | Status: ACTIVE | COMMUNITY
Start: 2025-09-18 | End: 1900-01-01